# Patient Record
Sex: FEMALE | Race: WHITE | NOT HISPANIC OR LATINO | Employment: FULL TIME | URBAN - METROPOLITAN AREA
[De-identification: names, ages, dates, MRNs, and addresses within clinical notes are randomized per-mention and may not be internally consistent; named-entity substitution may affect disease eponyms.]

---

## 2021-02-11 ENCOUNTER — IMMUNIZATION (OUTPATIENT)
Dept: PRIMARY CARE CLINIC | Facility: CLINIC | Age: 31
End: 2021-02-11
Payer: COMMERCIAL

## 2021-02-11 DIAGNOSIS — Z23 NEED FOR VACCINATION: Primary | ICD-10-CM

## 2021-02-11 PROCEDURE — 0001A COVID-19, MRNA, LNP-S, PF, 30 MCG/0.3 ML DOSE VACCINE: ICD-10-PCS | Mod: S$GLB,,, | Performed by: FAMILY MEDICINE

## 2021-02-11 PROCEDURE — 91300 COVID-19, MRNA, LNP-S, PF, 30 MCG/0.3 ML DOSE VACCINE: ICD-10-PCS | Mod: S$GLB,,, | Performed by: FAMILY MEDICINE

## 2021-02-11 PROCEDURE — 0001A COVID-19, MRNA, LNP-S, PF, 30 MCG/0.3 ML DOSE VACCINE: CPT | Mod: S$GLB,,, | Performed by: FAMILY MEDICINE

## 2021-02-11 PROCEDURE — 91300 COVID-19, MRNA, LNP-S, PF, 30 MCG/0.3 ML DOSE VACCINE: CPT | Mod: S$GLB,,, | Performed by: FAMILY MEDICINE

## 2021-03-04 ENCOUNTER — IMMUNIZATION (OUTPATIENT)
Dept: PRIMARY CARE CLINIC | Facility: CLINIC | Age: 31
End: 2021-03-04
Payer: COMMERCIAL

## 2021-03-04 DIAGNOSIS — Z23 NEED FOR VACCINATION: Primary | ICD-10-CM

## 2021-03-04 PROCEDURE — 91300 COVID-19, MRNA, LNP-S, PF, 30 MCG/0.3 ML DOSE VACCINE: ICD-10-PCS | Mod: S$GLB,,, | Performed by: FAMILY MEDICINE

## 2021-03-04 PROCEDURE — 0002A COVID-19, MRNA, LNP-S, PF, 30 MCG/0.3 ML DOSE VACCINE: ICD-10-PCS | Mod: CV19,S$GLB,, | Performed by: FAMILY MEDICINE

## 2021-03-04 PROCEDURE — 0002A COVID-19, MRNA, LNP-S, PF, 30 MCG/0.3 ML DOSE VACCINE: CPT | Mod: CV19,S$GLB,, | Performed by: FAMILY MEDICINE

## 2021-03-04 PROCEDURE — 91300 COVID-19, MRNA, LNP-S, PF, 30 MCG/0.3 ML DOSE VACCINE: CPT | Mod: S$GLB,,, | Performed by: FAMILY MEDICINE

## 2021-05-05 ENCOUNTER — OFFICE VISIT (OUTPATIENT)
Dept: CARDIOLOGY | Facility: CLINIC | Age: 31
End: 2021-05-05
Payer: COMMERCIAL

## 2021-05-05 ENCOUNTER — TELEPHONE (OUTPATIENT)
Dept: CARDIOLOGY | Facility: CLINIC | Age: 31
End: 2021-05-05

## 2021-05-05 VITALS
WEIGHT: 149 LBS | DIASTOLIC BLOOD PRESSURE: 67 MMHG | SYSTOLIC BLOOD PRESSURE: 98 MMHG | BODY MASS INDEX: 23.39 KG/M2 | HEIGHT: 67 IN | HEART RATE: 85 BPM

## 2021-05-05 DIAGNOSIS — I73.00 RAYNAUD'S DISEASE WITHOUT GANGRENE: ICD-10-CM

## 2021-05-05 DIAGNOSIS — I87.2 VENOUS INSUFFICIENCY OF BOTH LOWER EXTREMITIES: Primary | ICD-10-CM

## 2021-05-05 DIAGNOSIS — I83.813 VARICOSE VEINS OF BOTH LOWER EXTREMITIES WITH PAIN: ICD-10-CM

## 2021-05-05 PROCEDURE — 3008F BODY MASS INDEX DOCD: CPT | Mod: CPTII,S$GLB,, | Performed by: INTERNAL MEDICINE

## 2021-05-05 PROCEDURE — 99999 PR PBB SHADOW E&M-EST. PATIENT-LVL III: ICD-10-PCS | Mod: PBBFAC,,, | Performed by: INTERNAL MEDICINE

## 2021-05-05 PROCEDURE — 1126F AMNT PAIN NOTED NONE PRSNT: CPT | Mod: S$GLB,,, | Performed by: INTERNAL MEDICINE

## 2021-05-05 PROCEDURE — 99205 PR OFFICE/OUTPT VISIT, NEW, LEVL V, 60-74 MIN: ICD-10-PCS | Mod: S$GLB,,, | Performed by: INTERNAL MEDICINE

## 2021-05-05 PROCEDURE — 99999 PR PBB SHADOW E&M-EST. PATIENT-LVL III: CPT | Mod: PBBFAC,,, | Performed by: INTERNAL MEDICINE

## 2021-05-05 PROCEDURE — 99205 OFFICE O/P NEW HI 60 MIN: CPT | Mod: S$GLB,,, | Performed by: INTERNAL MEDICINE

## 2021-05-05 PROCEDURE — 1126F PR PAIN SEVERITY QUANTIFIED, NO PAIN PRESENT: ICD-10-PCS | Mod: S$GLB,,, | Performed by: INTERNAL MEDICINE

## 2021-05-05 PROCEDURE — 3008F PR BODY MASS INDEX (BMI) DOCUMENTED: ICD-10-PCS | Mod: CPTII,S$GLB,, | Performed by: INTERNAL MEDICINE

## 2021-05-05 RX ORDER — LORAZEPAM 0.5 MG/1
0.5 TABLET ORAL
COMMUNITY
Start: 2020-11-13 | End: 2024-03-08

## 2021-05-05 RX ORDER — AMLODIPINE BESYLATE 10 MG/1
10 TABLET ORAL
COMMUNITY
Start: 2020-11-13 | End: 2024-03-08

## 2021-05-05 RX ORDER — PANTOPRAZOLE SODIUM 40 MG/1
40 TABLET, DELAYED RELEASE ORAL 2 TIMES DAILY
COMMUNITY
Start: 2020-12-25 | End: 2021-12-03 | Stop reason: ALTCHOICE

## 2021-05-05 RX ORDER — ATORVASTATIN CALCIUM 40 MG/1
40 TABLET, FILM COATED ORAL
COMMUNITY
Start: 2020-11-13 | End: 2024-03-08

## 2021-12-03 ENCOUNTER — OFFICE VISIT (OUTPATIENT)
Dept: GASTROENTEROLOGY | Facility: CLINIC | Age: 31
End: 2021-12-03
Payer: COMMERCIAL

## 2021-12-03 VITALS — WEIGHT: 151 LBS | HEIGHT: 67 IN | BODY MASS INDEX: 23.7 KG/M2

## 2021-12-03 DIAGNOSIS — J02.9 SORE THROAT: ICD-10-CM

## 2021-12-03 DIAGNOSIS — R12 HEARTBURN: ICD-10-CM

## 2021-12-03 DIAGNOSIS — R05.9 COUGH: ICD-10-CM

## 2021-12-03 DIAGNOSIS — R09.A2 GLOBUS SENSATION: ICD-10-CM

## 2021-12-03 DIAGNOSIS — R07.9 NONSPECIFIC CHEST PAIN: ICD-10-CM

## 2021-12-03 DIAGNOSIS — Z87.19 HISTORY OF BARRETT'S ESOPHAGUS: Primary | ICD-10-CM

## 2021-12-03 PROBLEM — K21.9 GASTROESOPHAGEAL REFLUX DISEASE: Status: ACTIVE | Noted: 2021-01-06

## 2021-12-03 PROBLEM — J84.10 FIBROSIS OF LUNG: Status: ACTIVE | Noted: 2021-01-06

## 2021-12-03 PROBLEM — E55.9 HYPOVITAMINOSIS D: Status: ACTIVE | Noted: 2021-01-06

## 2021-12-03 PROBLEM — M34.9 SCLERODERMA: Status: ACTIVE | Noted: 2021-01-06

## 2021-12-03 PROBLEM — I73.00 RAYNAUD'S DISEASE WITHOUT GANGRENE: Status: ACTIVE | Noted: 2018-08-07

## 2021-12-03 PROBLEM — Z87.898 HISTORY OF VERTIGO: Status: ACTIVE | Noted: 2021-12-03

## 2021-12-03 PROBLEM — L98.499: Status: ACTIVE | Noted: 2018-08-07

## 2021-12-03 PROCEDURE — 99999 PR PBB SHADOW E&M-EST. PATIENT-LVL III: ICD-10-PCS | Mod: PBBFAC,,, | Performed by: NURSE PRACTITIONER

## 2021-12-03 PROCEDURE — 99203 OFFICE O/P NEW LOW 30 MIN: CPT | Mod: S$GLB,,, | Performed by: NURSE PRACTITIONER

## 2021-12-03 PROCEDURE — 99203 PR OFFICE/OUTPT VISIT, NEW, LEVL III, 30-44 MIN: ICD-10-PCS | Mod: S$GLB,,, | Performed by: NURSE PRACTITIONER

## 2021-12-03 PROCEDURE — 99999 PR PBB SHADOW E&M-EST. PATIENT-LVL III: CPT | Mod: PBBFAC,,, | Performed by: NURSE PRACTITIONER

## 2021-12-03 RX ORDER — FAMOTIDINE 40 MG/1
40 TABLET, FILM COATED ORAL NIGHTLY
Qty: 30 TABLET | Refills: 3 | Status: SHIPPED | OUTPATIENT
Start: 2021-12-03 | End: 2022-07-05

## 2021-12-03 RX ORDER — ESCITALOPRAM OXALATE 10 MG/1
1 TABLET ORAL DAILY
COMMUNITY
Start: 2021-01-06 | End: 2024-03-08 | Stop reason: SDUPTHER

## 2021-12-03 RX ORDER — RABEPRAZOLE SODIUM 20 MG/1
20 TABLET, DELAYED RELEASE ORAL
Qty: 30 TABLET | Refills: 3 | Status: SHIPPED | OUTPATIENT
Start: 2021-12-03 | End: 2022-02-10

## 2021-12-06 ENCOUNTER — ANESTHESIA (OUTPATIENT)
Dept: ENDOSCOPY | Facility: HOSPITAL | Age: 31
End: 2021-12-06
Payer: COMMERCIAL

## 2021-12-06 ENCOUNTER — ANESTHESIA EVENT (OUTPATIENT)
Dept: ENDOSCOPY | Facility: HOSPITAL | Age: 31
End: 2021-12-06
Payer: COMMERCIAL

## 2021-12-06 ENCOUNTER — HOSPITAL ENCOUNTER (OUTPATIENT)
Facility: HOSPITAL | Age: 31
Discharge: HOME | End: 2021-12-06
Attending: INTERNAL MEDICINE | Admitting: INTERNAL MEDICINE
Payer: COMMERCIAL

## 2021-12-06 VITALS
TEMPERATURE: 98 F | OXYGEN SATURATION: 98 % | RESPIRATION RATE: 16 BRPM | DIASTOLIC BLOOD PRESSURE: 62 MMHG | HEART RATE: 58 BPM | SYSTOLIC BLOOD PRESSURE: 105 MMHG

## 2021-12-06 DIAGNOSIS — K22.70 BARRETT'S ESOPHAGUS: ICD-10-CM

## 2021-12-06 LAB
B-HCG UR QL: NEGATIVE
CTP QC/QA: YES

## 2021-12-06 PROCEDURE — 88305 TISSUE EXAM BY PATHOLOGIST: CPT | Performed by: PATHOLOGY

## 2021-12-06 PROCEDURE — 43239 EGD BIOPSY SINGLE/MULTIPLE: CPT | Mod: ,,, | Performed by: INTERNAL MEDICINE

## 2021-12-06 PROCEDURE — 88312 SPECIAL STAINS GROUP 1: CPT | Performed by: PATHOLOGY

## 2021-12-06 PROCEDURE — 88305 TISSUE EXAM BY PATHOLOGIST: ICD-10-PCS | Mod: 26,,, | Performed by: PATHOLOGY

## 2021-12-06 PROCEDURE — 63600175 PHARM REV CODE 636 W HCPCS: Mod: PO | Performed by: INTERNAL MEDICINE

## 2021-12-06 PROCEDURE — 63600175 PHARM REV CODE 636 W HCPCS: Mod: PO | Performed by: NURSE ANESTHETIST, CERTIFIED REGISTERED

## 2021-12-06 PROCEDURE — D9220A PRA ANESTHESIA: Mod: ANES,,, | Performed by: ANESTHESIOLOGY

## 2021-12-06 PROCEDURE — 88312 PR  SPECIAL STAINS,GROUP I: ICD-10-PCS | Mod: 26,,, | Performed by: PATHOLOGY

## 2021-12-06 PROCEDURE — 37000008 HC ANESTHESIA 1ST 15 MINUTES: Mod: PO | Performed by: INTERNAL MEDICINE

## 2021-12-06 PROCEDURE — D9220A PRA ANESTHESIA: ICD-10-PCS | Mod: ANES,,, | Performed by: ANESTHESIOLOGY

## 2021-12-06 PROCEDURE — 43239 PR EGD, FLEX, W/BIOPSY, SGL/MULTI: ICD-10-PCS | Mod: ,,, | Performed by: INTERNAL MEDICINE

## 2021-12-06 PROCEDURE — 81025 URINE PREGNANCY TEST: CPT | Mod: PO | Performed by: INTERNAL MEDICINE

## 2021-12-06 PROCEDURE — D9220A PRA ANESTHESIA: ICD-10-PCS | Mod: CRNA,,, | Performed by: NURSE ANESTHETIST, CERTIFIED REGISTERED

## 2021-12-06 PROCEDURE — D9220A PRA ANESTHESIA: Mod: CRNA,,, | Performed by: NURSE ANESTHETIST, CERTIFIED REGISTERED

## 2021-12-06 PROCEDURE — 88305 TISSUE EXAM BY PATHOLOGIST: CPT | Mod: 26,,, | Performed by: PATHOLOGY

## 2021-12-06 PROCEDURE — 27201012 HC FORCEPS, HOT/COLD, DISP: Mod: PO | Performed by: INTERNAL MEDICINE

## 2021-12-06 PROCEDURE — 43239 EGD BIOPSY SINGLE/MULTIPLE: CPT | Mod: PO | Performed by: INTERNAL MEDICINE

## 2021-12-06 PROCEDURE — 88312 SPECIAL STAINS GROUP 1: CPT | Mod: 26,,, | Performed by: PATHOLOGY

## 2021-12-06 PROCEDURE — 37000009 HC ANESTHESIA EA ADD 15 MINS: Mod: PO | Performed by: INTERNAL MEDICINE

## 2021-12-06 RX ORDER — SODIUM CHLORIDE, SODIUM LACTATE, POTASSIUM CHLORIDE, CALCIUM CHLORIDE 600; 310; 30; 20 MG/100ML; MG/100ML; MG/100ML; MG/100ML
INJECTION, SOLUTION INTRAVENOUS CONTINUOUS
Status: DISCONTINUED | OUTPATIENT
Start: 2021-12-06 | End: 2021-12-06 | Stop reason: HOSPADM

## 2021-12-06 RX ORDER — FENTANYL CITRATE 50 UG/ML
INJECTION, SOLUTION INTRAMUSCULAR; INTRAVENOUS
Status: DISCONTINUED | OUTPATIENT
Start: 2021-12-06 | End: 2021-12-06

## 2021-12-06 RX ORDER — PROPOFOL 10 MG/ML
VIAL (ML) INTRAVENOUS
Status: DISCONTINUED | OUTPATIENT
Start: 2021-12-06 | End: 2021-12-06

## 2021-12-06 RX ORDER — SODIUM CHLORIDE 0.9 % (FLUSH) 0.9 %
10 SYRINGE (ML) INJECTION
Status: DISCONTINUED | OUTPATIENT
Start: 2021-12-06 | End: 2021-12-06 | Stop reason: HOSPADM

## 2021-12-06 RX ADMIN — PROPOFOL 50 MG: 10 INJECTION, EMULSION INTRAVENOUS at 09:12

## 2021-12-06 RX ADMIN — SODIUM CHLORIDE, SODIUM LACTATE, POTASSIUM CHLORIDE, AND CALCIUM CHLORIDE: .6; .31; .03; .02 INJECTION, SOLUTION INTRAVENOUS at 08:12

## 2021-12-06 RX ADMIN — FENTANYL CITRATE 50 MCG: 50 INJECTION, SOLUTION INTRAMUSCULAR; INTRAVENOUS at 09:12

## 2021-12-13 ENCOUNTER — TELEPHONE (OUTPATIENT)
Dept: GASTROENTEROLOGY | Facility: CLINIC | Age: 31
End: 2021-12-13
Payer: COMMERCIAL

## 2021-12-14 ENCOUNTER — TELEPHONE (OUTPATIENT)
Dept: GASTROENTEROLOGY | Facility: CLINIC | Age: 31
End: 2021-12-14
Payer: COMMERCIAL

## 2021-12-17 LAB
FINAL PATHOLOGIC DIAGNOSIS: NORMAL
GROSS: NORMAL
Lab: NORMAL

## 2022-02-10 ENCOUNTER — PATIENT MESSAGE (OUTPATIENT)
Dept: GASTROENTEROLOGY | Facility: CLINIC | Age: 32
End: 2022-02-10
Payer: COMMERCIAL

## 2022-02-10 DIAGNOSIS — Z87.19 HISTORY OF BARRETT'S ESOPHAGUS: Primary | ICD-10-CM

## 2022-02-10 DIAGNOSIS — R12 HEARTBURN: ICD-10-CM

## 2022-02-10 RX ORDER — OMEPRAZOLE 40 MG/1
40 CAPSULE, DELAYED RELEASE ORAL
Qty: 60 CAPSULE | Refills: 5 | Status: SHIPPED | OUTPATIENT
Start: 2022-02-10 | End: 2023-03-01 | Stop reason: SDUPTHER

## 2022-04-07 PROBLEM — Z30.432 ENCOUNTER FOR IUD REMOVAL: Status: ACTIVE | Noted: 2022-04-07

## 2022-04-07 PROBLEM — T83.32XA MALPOSITIONED INTRAUTERINE DEVICE: Status: ACTIVE | Noted: 2022-04-07

## 2022-05-09 ENCOUNTER — OFFICE VISIT (OUTPATIENT)
Dept: OPTOMETRY | Facility: CLINIC | Age: 32
End: 2022-05-09
Payer: COMMERCIAL

## 2022-05-09 DIAGNOSIS — H52.7 REFRACTIVE ERROR: ICD-10-CM

## 2022-05-09 DIAGNOSIS — M34.9 SCLERODERMA: Primary | ICD-10-CM

## 2022-05-09 PROCEDURE — 99999 PR PBB SHADOW E&M-EST. PATIENT-LVL III: ICD-10-PCS | Mod: PBBFAC,,, | Performed by: OPTOMETRIST

## 2022-05-09 PROCEDURE — 1159F PR MEDICATION LIST DOCUMENTED IN MEDICAL RECORD: ICD-10-PCS | Mod: CPTII,S$GLB,, | Performed by: OPTOMETRIST

## 2022-05-09 PROCEDURE — 92004 COMPRE OPH EXAM NEW PT 1/>: CPT | Mod: S$GLB,,, | Performed by: OPTOMETRIST

## 2022-05-09 PROCEDURE — 92004 PR EYE EXAM, NEW PATIENT,COMPREHESV: ICD-10-PCS | Mod: S$GLB,,, | Performed by: OPTOMETRIST

## 2022-05-09 PROCEDURE — 1160F PR REVIEW ALL MEDS BY PRESCRIBER/CLIN PHARMACIST DOCUMENTED: ICD-10-PCS | Mod: CPTII,S$GLB,, | Performed by: OPTOMETRIST

## 2022-05-09 PROCEDURE — 99999 PR PBB SHADOW E&M-EST. PATIENT-LVL III: CPT | Mod: PBBFAC,,, | Performed by: OPTOMETRIST

## 2022-05-09 PROCEDURE — 1160F RVW MEDS BY RX/DR IN RCRD: CPT | Mod: CPTII,S$GLB,, | Performed by: OPTOMETRIST

## 2022-05-09 PROCEDURE — 1159F MED LIST DOCD IN RCRD: CPT | Mod: CPTII,S$GLB,, | Performed by: OPTOMETRIST

## 2022-05-09 RX ORDER — PANTOPRAZOLE SODIUM 40 MG/1
1 TABLET, DELAYED RELEASE ORAL
COMMUNITY
Start: 2022-02-11 | End: 2022-07-05

## 2022-05-09 NOTE — PROGRESS NOTES
HPI     Concerns About Ocular Health      Additional comments: Ocular health exam              Comments     DLE: x 3 yrs    Pt states decrease in near vision over past year. Was prescribed rx gls x   3 yrs ago but did not have filled.   No drops used          Last edited by Mod[place] Eli, OD on 5/9/2022  9:59 AM. (History)            Assessment /Plan     For exam results, see Encounter Report.    Scleroderma    Refractive error      1. No ocular signs at this time, use artificial tears for dryness as needed, RTC yearly eye exam.  2. Cont with OTC +1.00 to +1.25

## 2022-07-04 DIAGNOSIS — Z87.19 HISTORY OF BARRETT'S ESOPHAGUS: ICD-10-CM

## 2022-07-04 DIAGNOSIS — R12 HEARTBURN: ICD-10-CM

## 2022-07-05 RX ORDER — FAMOTIDINE 40 MG/1
TABLET, FILM COATED ORAL
Qty: 30 TABLET | Refills: 3 | Status: SHIPPED | OUTPATIENT
Start: 2022-07-05 | End: 2023-03-01 | Stop reason: SDUPTHER

## 2024-01-25 ENCOUNTER — TELEPHONE (OUTPATIENT)
Dept: GASTROENTEROLOGY | Facility: CLINIC | Age: 34
End: 2024-01-25
Payer: COMMERCIAL

## 2024-01-25 NOTE — TELEPHONE ENCOUNTER
----- Message from Sherry Gordon sent at 1/25/2024 11:03 AM CST -----  Regarding: Reschedule  Pt would like a call to reschedule her appt.   She waited and then Iwe realized she was not fully checked in and missed her appt.   I did offer her the available appt that was offered but she would just like a call to be rescheduled.   Thank you

## 2024-03-08 PROBLEM — F41.9 ANXIETY AND DEPRESSION: Status: ACTIVE | Noted: 2024-03-08

## 2024-03-08 PROBLEM — F32.A ANXIETY AND DEPRESSION: Status: ACTIVE | Noted: 2024-03-08

## 2024-04-09 ENCOUNTER — TELEPHONE (OUTPATIENT)
Dept: GASTROENTEROLOGY | Facility: CLINIC | Age: 34
End: 2024-04-09
Payer: COMMERCIAL

## 2024-04-09 NOTE — TELEPHONE ENCOUNTER
----- Message from Jesús Serrano sent at 4/9/2024  9:55 AM CDT -----  Regarding: Appt request  Type:  Appointment Request    Caller is requesting an appointment.     Name of Caller:  Pt    Symptoms: follow up    Would the patient rather a call back or a response via [facility]? Call back    Best Call Back Number:  [phone]      Additional Information:  No provider comes up after completing the [employer].   Please advise -- Thank you

## 2024-04-16 ENCOUNTER — OFFICE VISIT (OUTPATIENT)
Dept: GASTROENTEROLOGY | Facility: CLINIC | Age: 34
End: 2024-04-16
Payer: COMMERCIAL

## 2024-04-16 VITALS — BODY MASS INDEX: 23.08 KG/M2 | WEIGHT: 147.06 LBS | HEIGHT: 67 IN

## 2024-04-16 DIAGNOSIS — K22.70 BARRETT'S ESOPHAGUS WITHOUT DYSPLASIA: Primary | ICD-10-CM

## 2024-04-16 DIAGNOSIS — Z51.81 ENCOUNTER FOR MONITORING LONG-TERM PROTON PUMP INHIBITOR THERAPY: ICD-10-CM

## 2024-04-16 DIAGNOSIS — Z79.899 ENCOUNTER FOR MONITORING LONG-TERM PROTON PUMP INHIBITOR THERAPY: ICD-10-CM

## 2024-04-16 PROCEDURE — 99214 OFFICE O/P EST MOD 30 MIN: CPT | Mod: S$GLB,,, | Performed by: NURSE PRACTITIONER

## 2024-04-16 PROCEDURE — 1159F MED LIST DOCD IN RCRD: CPT | Mod: CPTII,S$GLB,, | Performed by: NURSE PRACTITIONER

## 2024-04-16 PROCEDURE — 3008F BODY MASS INDEX DOCD: CPT | Mod: CPTII,S$GLB,, | Performed by: NURSE PRACTITIONER

## 2024-04-16 PROCEDURE — 99999 PR PBB SHADOW E&M-EST. PATIENT-LVL III: CPT | Mod: PBBFAC,,, | Performed by: NURSE PRACTITIONER

## 2024-04-16 PROCEDURE — 1160F RVW MEDS BY RX/DR IN RCRD: CPT | Mod: CPTII,S$GLB,, | Performed by: NURSE PRACTITIONER

## 2024-04-16 RX ORDER — MYCOPHENOLATE MOFETIL 500 MG/1
500 TABLET ORAL 2 TIMES DAILY
COMMUNITY

## 2024-04-16 NOTE — PATIENT INSTRUCTIONS
Gonzalez's Esophagus Discharge Instructions   About this topic   The esophagus is a tube which passes food from the mouth to the stomach. Sometimes, normal cells in the lower part of the esophagus change to a different kind of cell. This is called Gonzalez's esophagus. It is most often caused by acid reflux which happens when the contents of the belly leak into the food pipe. This leaking can irritate the food pipe. If you have acid reflux, your doctor may also check to see if you have Gonzalez's esophagus. Gonzalez's esophagus is not cancer. A small number of the abnormal cells may turn into precancer or cancer of the esophagus. Doctors take a biopsy of your esophagus to find these abnormal cells.     What care is needed at home?   Ask your doctor what you need to do when you go home. Make sure you ask questions if you do not understand what the doctor says. This way you will know what you need to do.  Take all drugs as ordered by your doctor. Drink lots of water when taking your drugs.  Keep a healthy weight.  Avoid stress.  Avoid belts and clothing that are too tight.  Eat small meals more often. Do not skip meals. Do not eat large meals to make up for missed meals.  Avoid eating 2 to 3 hours before bedtime.  Avoid foods that cause you to have heartburn.  Do not to lie down for at least 2 hours after eating.  Raise the head of your bed 6 to 8 inches (15 to 20 cm). Use wooden blocks under the head of the bed. Just sleeping with your head raised on pillows is not enough.  Do not drink beer, wine, and mixed drinks (alcohol).  Do not smoke.  What follow-up care is needed?   Your doctor may ask you to make visits to the office to check on your progress. Be sure to keep these visits. You may need regular checks of your esophagus.  What drugs may be needed?   The doctor may order drugs to:  Relieve heartburn  Prevent reflux  Lessen acid production  Heal the esophageal lining  Will physical activity be limited?   Your  physical activities will not be limited.  What changes to diet are needed?   Limit caffeine intake.  Avoid eating oranges, berries, tomatoes, and other foods high in acid.  Eat only small amounts of spicy, fatty, and fried foods, or avoid them altogether.  What problems could happen?   Precancerous changes in the esophagus  Higher risk of cancer of the esophagus  What can be done to prevent this health problem?   Eat a healthy diet. Eat lots of fruits and vegetables.  If you weigh too much, lose weight.  If you are a smoker, stop smoking.  Limit beer, wine, and mixed drinks (alcohol).  When do I need to call the doctor?   Pain or a feeling of food getting stuck in your throat  Throwing up often or throwing up fluid that looks like blood or coffee grounds  Black tarry stools  Pain in the neck, chest, or back  Very bad heartburn that lasts for a long time  Cough, hoarse voice, or bad breath  Wheezing, shortness of breath or other problems breathing  Unintended weight loss or not wanting to eat  You are not feeling better in 2 to 3 days or you are feeling worse  Teach Back: Helping You Understand   The Teach Back Method helps you understand the information we are giving you. After you talk with the staff, tell them in your own words what you learned. This helps to make sure the staff has described each thing clearly. It also helps to explain things that may have been confusing. Before going home, make sure you can do these:  I can tell you about my condition.  I can tell you what I can do to help my acid reflux.  I can tell you what I will do if I feel like I have food stuck in my throat; pain in my neck, chest, or back; or have trouble breathing.  Where can I learn more?   American Academy [employer]  [email or web address]   Last Reviewed Date   2021-03-18  Consumer Information Use and Disclaimer   This information is not specific medical advice and does not replace  information you receive from your health care provider. This is only a brief summary of general information. It does NOT include all information about conditions, illnesses, injuries, tests, procedures, treatments, therapies, discharge instructions or life-style choices that may apply to you. You must talk with your health care provider for complete information about your health and treatment options. This information should not be used to decide whether or not to accept your health care providers advice, instructions or recommendations. Only your health care provider has the knowledge and training to provide advice that is right for you.  Copyright   Copyright © 2021 UpToDate, Inc. and its affiliates and/or licensors. All rights reserved.      Gonzalez's Esophagus   The Basics   Written by the doctors and editors at [employer]   What is Gonzalez's esophagus? -- Gonzalez's esophagus is a condition that affects the esophagus (the tube that carries food from the mouth to the stomach) (figure 1). When people have Gonzalez's esophagus, the normal cells in the lower part of their esophagus are replaced by a different type of cell.  Gonzalez's esophagus is usually caused by acid reflux. Acid reflux is when the acid that is normally in your stomach backs up into the esophagus. Many people with acid reflux never get Gonzalez's esophagus, but some do.  If you have had acid reflux for a long time, it's important to know if you also have Gonzalez's esophagus. That's because Gonzalez's esophagus can later turn into pre-cancer or cancer of the esophagus.  What are the symptoms of Gonzalez's esophagus? -- Gonzalez's esophagus does not cause any symptoms. But people usually have symptoms from their acid reflux, such as:  Burning in the chest, known as heartburn  Burning in the throat or an acid taste in the throat  Vomiting after eating  Trouble swallowing  Is there a test for Gonzalez's esophagus? -- Yes. Your doctor can do a test called an  upper endoscopy to check for Gonzalez's esophagus. Your doctor might do this if you have had acid reflux for more than 5 years.  During an upper endoscopy, a doctor puts a thin tube with a camera and light on the end into your mouth and down into your esophagus (figure 2). He or she will look at the lining of the esophagus and take a small sample of it. Another doctor will look at the cells under a microscope to see if you have Gonzalez's esophagus.  How is Gonzalez's esophagus treated? -- Gonzalez's esophagus is treated by reducing or getting rid of a person's acid reflux. Treatment does not usually cure Gonzalez's esophagus, but it keeps it from getting worse.  Your doctor will likely give you medicines to stop your stomach from making acid. He or she might also recommend that you:  Avoid caffeine drinks, alcohol, chocolate, peppermint, and fatty foods. These foods can make acid reflux worse.  Avoid eating before going to bed, eating large meals, or lying down after eating  Raise the head of your bed by 6 to 8 inches (for example, by putting wood blocks under 2 legs of the bed)  Should I follow up with my doctor? -- Yes. If you have Gonzalez's esophagus, you should follow up with your doctor. He or she will keep checking that your Gonzalez's esophagus does not turn into pre-cancer or cancer.  What if my Gonzalez's esophagus turns into pre-cancer or cancer? -- If this happens, your doctor will talk with you about different ways to treat it.  All topics are updated as new evidence becomes available and our peer review process is complete.  This topic retrieved from [employer] on: Sep 21, 2021.  Topic 28645 Version 10.0  Release: 29.4.2 - C29.263  © 2021 UpToDate, Inc. and/or its affiliates. All rights reserved.  figure 1: Upper digestive tract     The upper digestive tract includes the esophagus (the tube that connects the mouth to the stomach), the stomach, and the duodenum (the first part of the small intestine).  Graphic  23803 Version 6.0    figure 2: Upper endoscopy     During an upper endoscopy, you lie down and the doctor puts a thin tube with a camera and light on the end (called an endoscope) into your mouth and down into your esophagus, stomach, and duodenum (the first part of your small intestine). The camera sends pictures from inside your body to a television screen. That way, your doctor can see the inside of your esophagus, stomach, and duodenum.  Graphic 39914 Version 4.0    Consumer Information Use and Disclaimer   This information is not specific medical advice and does not replace information you receive from your health care provider. This is only a brief summary of general information. It does NOT include all information about conditions, illnesses, injuries, tests, procedures, treatments, therapies, discharge instructions or life-style choices that may apply to you. You must talk with your health care provider for complete information about your health and treatment options. This information should not be used to decide whether or not to accept your health care provider's advice, instructions or recommendations. Only your health care provider has the knowledge and training to provide advice that is right for you. The use of this information is governed by the [employer] End User License Agreement, available at https://www.[employer]/en/solutions/Apps4All/about/beth.The use of [employer] content is governed by the [employer] Terms of Use. ©2021 UpToDate, Inc. All rights reserved.  Copyright   © 2021 UpToDate, Inc. and/or its affiliates. All rights reserved.

## 2024-04-16 NOTE — PROGRESS NOTES
Subjective:       Patient ID: Nikki Rico is a 33 y.o. female Body mass index is 23.03 kg/m².    Chief Complaint: barretts    This patient is established with Dr. Michelle & myself.     Reports her rheumatologist recently did a full lab workup, Dr Armenta.    Gastroesophageal Reflux  She complains of heartburn (occurs a handful of times a month). She reports no abdominal pain, no belching, no chest pain, no choking, no coughing, no dysphagia, no globus sensation, no hoarse voice, no nausea or no sore throat. This is a chronic (started several years ago) problem. The problem has been gradually worsening. The symptoms are aggravated by ETOH and certain foods. Pertinent negatives include no fatigue, melena or weight loss. Risk factors include smoking/tobacco exposure, hiatal hernia, Gonzalez's esophagus and ETOH use. She has tried a PPI, a histamine-2 antagonist, an antacid and ETOH reduction (protonix 40 mg once daily and occasional BID PRN, pepcid 40 mg nightly; PAST: zantac- helped, prilosec, nexium- only mild relief with use, aciphex- cost) for the symptoms. Past procedures include an EGD.     Review of Systems   Constitutional:  Negative for appetite change, chills, fatigue, fever and weight loss.   HENT:  Negative for hoarse voice, sore throat and trouble swallowing.    Respiratory:  Negative for cough, choking and shortness of breath.    Cardiovascular:  Negative for chest pain.   Gastrointestinal:  Positive for heartburn (occurs a handful of times a month). Negative for abdominal pain, anal bleeding, blood in stool, constipation, diarrhea, dysphagia, melena, nausea, rectal pain and vomiting.   Genitourinary:  Negative for difficulty urinating, dysuria and flank pain.   Neurological:  Negative for weakness.       No LMP recorded. Patient has had an implant.  Past Medical History:   Diagnosis Date    Gonzalez's esophagus 2013    Depression with anxiety     Lung fibrosis     Raynaud's disease     Scleroderma       Past Surgical History:   Procedure Laterality Date    ESOPHAGOGASTRODUODENOSCOPY  01/2017    patient states barretts and hiatal hernia--told to rpt 3yrs    ESOPHAGOGASTRODUODENOSCOPY  2018    Dr. Yoo, repeat in 3 years per patient report    ESOPHAGOGASTRODUODENOSCOPY N/A 12/06/2021    Procedure: EGD (ESOPHAGOGASTRODUODENOSCOPY);  Surgeon: Beto Michelle Jr., MD;  Location: [facility];  Service: Endoscopy;  Laterality: N/A;  Repeat upper endoscopy in 2-3 years for surveillance of Gonzalez's esophagus    HYSTEROSCOPY N/A 04/07/2022    Procedure: HYSTEROSCOPY/ IUD removal;  Surgeon: Mikey Cazares MD;  Location: [facility];  Service: OB/GYN;  Laterality: N/A;    HYSTEROSCOPY  04/07/2022    Procedure: ;  Surgeon: Mikey Cazares MD;  Location: [facility];  Service: OB/GYN;;     Family History   Problem Relation Name Age of Onset    Heart disease Mother          had cabg    Cancer Father          lung (smoker)    No Known Problems Sister      No Known Problems Sister      No Known Problems Brother      No Known Problems Brother      No Known Problems Brother      Colon cancer Neg Hx      Crohn's disease Neg Hx      Esophageal cancer Neg Hx      Stomach cancer Neg Hx      Ulcerative colitis Neg Hx      Glaucoma Neg Hx      Macular degeneration Neg Hx      Retinal detachment Neg Hx       Social History     Tobacco Use    Smoking status: Former     Types: Cigarettes    Smokeless tobacco: Never   Substance Use Topics    Alcohol use: Yes     Alcohol/week: 5.0 standard drinks of alcohol     Types: 5 Shots of liquor per week    Drug use: Never     Wt Readings from Last 10 Encounters:   04/16/24 66.7 kg (147 lb 0.8 oz)   03/08/24 69.1 kg (152 lb 4.8 oz)   04/07/22 68.2 kg (150 lb 5.7 oz)   12/03/21 68.5 kg (151 lb 0.2 oz)   05/05/21 67.6 kg (149 lb)     Reviewed prior medical records including endoscopy history (see surgical history).    Objective:      Physical Exam  Vitals and nursing note reviewed.   Constitutional:        General: She is not in acute distress.     Appearance: Normal appearance. She is well-developed. She is not diaphoretic.   HENT:      Mouth/Throat:      Lips: Pink. No lesions.      Mouth: Mucous membranes are moist. No oral lesions.      Tongue: No lesions.      Pharynx: Oropharynx is clear. No pharyngeal swelling or posterior oropharyngeal erythema.   Eyes:      General: No scleral icterus.     Conjunctiva/sclera: Conjunctivae normal.   Pulmonary:      Effort: Pulmonary effort is normal. No respiratory distress.      Breath sounds: Normal breath sounds. No wheezing.   Abdominal:      General: Bowel sounds are normal. There is no distension or abdominal bruit.      Palpations: Abdomen is soft. Abdomen is not rigid. There is no mass.      Tenderness: There is no abdominal tenderness. There is no guarding or rebound. Negative signs include Gonzáles's sign and McBurney's sign.   Skin:     General: Skin is warm and dry.      Coloration: Skin is not jaundiced or pale.      Findings: No erythema or rash.   Neurological:      Mental Status: She is alert and oriented to person, place, and time.   Psychiatric:         Behavior: Behavior normal.         Thought Content: Thought content normal.         Judgment: Judgment normal.         Assessment:       1. Gonzalez's esophagus without dysplasia    2. Encounter for monitoring long-term proton pump inhibitor therapy          Plan:     Patient agreed to sign medical records release consent for us to obtain records from Dr. Armenta (to include lab work)     Gonzalez's esophagus without dysplasia  - schedule EGD, discussed procedure with patient, including risks and benefits, patient verbalized understanding  - discussed diagnosis in detail with patient and the need for long term reflux medication and surveillance EGDs; patient verbalized understanding and agreed with management plan  - Discussed about treatment options, patient verbalized understanding and patient reports she  wants to continue current medications at prescribed dosages.  - CONTINUE PRILOSEC 40 MG BID AS PRESCRIBED, rather than once daily and second dose PRN  - CONTINUE PEPCID 40 MG ONCE DAILY AT NIGHT AS PRESCRIBED    Encounter for monitoring long-term proton pump inhibitor therapy  -     Vitamin B12; Future; Expected date: 04/16/2024  -     Magnesium; Future; Expected date: 04/16/2024  - discussed with patient about long term use of reflux medications, the risk and benefits of using these medications long term, the risk of untreated GERD such as Gonzalez's esophagus/progression of Gonzalez's esophagus, and recommend a diet high in calcium and/or taking OTC calcium and vitamin d supplements as directed (such as Citracal +D), patient verbalized understanding  - recommend annual monitoring with blood work to include CMP, CBC, vitamin B12, and magnesium.    Follow up in about 3 months (around 7/16/2024), or if symptoms worsen or fail to improve.      If no improvement in symptoms or symptoms worsen, call/follow-up at clinic or go to ER.        31 minutes of total time spent on the encounter, which includes face to face time and non-face to face time preparing to see the patient (eg, review of tests), Obtaining and/or reviewing separately obtained history, Documenting clinical information in the electronic or other health record, Independently interpreting results (not separately reported) and communicating results to the patient/family/caregiver, or Care coordination (not separately reported).

## 2024-05-01 ENCOUNTER — TREATMENT PLANNING (OUTPATIENT)
Dept: GASTROENTEROLOGY | Facility: CLINIC | Age: 34
End: 2024-05-01
Payer: COMMERCIAL

## 2024-05-01 NOTE — PROGRESS NOTES
Reviewed medical records received from Dr. Armenta, summarized below and in medical & surgical history (endoscopies, etc), copies made & given to nurse to be scanned into system:   4/3/2024 labs: HBSAG screen negative, hepatitis B Core AB total negative, hep b surface ab qual reactive (consistent with immunity); sed rate WNL, CRP WNL; quantiferon TB gold plus neagtive; HCV antibody RFX to quant PCR nonreactive; SUDHA  profile: anti-nuclear AB BY IFA (RDL) positive, homogeneous pattern > 1:1280, speckled pattern > 1:1280, anti-SCL-70 AB (RDL) 129 (H), repeat anti-scl-70 positive; CMP WNL except fot creatinine 0.5 (L), CBC WNL    See records for full results (poor copy quality)  Recommendations:  Continue with previous recommendations & follow-up with rheumatology. Did not see vitamin B12 and magnesium levels checked on recent labs. Please have patient complete these as previously ordered. Message routed to staff.

## 2024-05-01 NOTE — Clinical Note
Please inform patient that we have received and reviewed her prior labs. Continue with previous recommendations & follow-up with rheumatology. Did not see vitamin B12 and magnesium levels checked on recent labs. Please have patient complete these as previously ordered. Thanks MEGAN

## 2024-05-02 ENCOUNTER — TELEPHONE (OUTPATIENT)
Dept: GASTROENTEROLOGY | Facility: CLINIC | Age: 34
End: 2024-05-02
Payer: COMMERCIAL

## 2024-05-02 NOTE — TELEPHONE ENCOUNTER
----- Message from DUARTE Sanders sent at 5/1/2024  9:17 AM CDT -----  Please inform patient that we have received and reviewed her prior labs. Continue with previous recommendations & follow-up with rheumatology. Did not see vitamin B12 and magnesium levels checked on recent labs. Please have patient complete these as previously ordered.  Thanks  MEGAN

## 2024-07-22 NOTE — H&P
History & Physical - Short Stay  Gastroenterology      SUBJECTIVE:     Procedure: Gastroscopy    Chief Complaint/Indication for Procedure:  Hx Gonzalez's Esophagus.    History of Present Illness:  See recent GI OV note:  Office Visit   4/16/2024  [facility]       Sherley Bravo, DUARTE  Gastroenterology Gonzalez's esophagus without dysplasia +1 more  Dx barretts  Reason for Visit     Progress Notes    Sherley Bravo FNP at 4/16/2024 10:00 AM    Status: Signed   Expand All Collapse All  Subjective:         Subjective  Patient ID: Nikki Rico is a 33 y.o. female Body mass index is 23.03 kg/m².     Chief Complaint: barretts     This patient is established with Dr. Michelle & myself.     Reports her rheumatologist recently did a full lab workup, Dr Armenta.     Gastroesophageal Reflux  She complains of heartburn (occurs a handful of times a month). She reports no abdominal pain, no belching, no chest pain, no choking, no coughing, no dysphagia, no globus sensation, no hoarse voice, no nausea or no sore throat. This is a chronic (started several years ago) problem. The problem has been gradually worsening. The symptoms are aggravated by ETOH and certain foods. Pertinent negatives include no fatigue, melena or weight loss. Risk factors include smoking/tobacco exposure, hiatal hernia, Gonzalez's esophagus and ETOH use. She has tried a PPI, a histamine-2 antagonist, an antacid and ETOH reduction (protonix 40 mg once daily and occasional BID PRN, pepcid 40 mg nightly; PAST: zantac- helped, prilosec, nexium- only mild relief with use, aciphex- cost) for the symptoms. Past procedures include an EGD.      Review of Systems   Constitutional:  Negative for appetite change, chills, fatigue, fever and weight loss.   HENT:  Negative for hoarse voice, sore throat and trouble swallowing.    Respiratory:  Negative for cough, choking and shortness of breath.    Cardiovascular:  Negative for chest pain.    Gastrointestinal:  Positive for heartburn (occurs a handful of times a month). Negative for abdominal pain, anal bleeding, blood in stool, constipation, diarrhea, dysphagia, melena, nausea, rectal pain and vomiting.       Assessment:      Assessment  1. Gonzalez's esophagus without dysplasia    2. Encounter for monitoring long-term proton pump inhibitor therapy             Plan:      Plan  Patient agreed to sign medical records release consent for us to obtain records from Dr. Armenta (to include lab work)     Gonzalez's esophagus without dysplasia  - schedule EGD, discussed procedure with patient, including risks and benefits, patient verbalized understanding  - discussed diagnosis in detail with patient and the need for long term reflux medication and surveillance EGDs; patient verbalized understanding and agreed with management plan  - Discussed about treatment options, patient verbalized understanding and patient reports she wants to continue current medications at prescribed dosages.  - CONTINUE PRILOSEC 40 MG BID AS PRESCRIBED, rather than once daily and second dose PRN  - CONTINUE PEPCID 40 MG ONCE DAILY AT NIGHT AS PRESCRIBED     Encounter for monitoring long-term proton pump inhibitor therapy  -     Vitamin B12; Future; Expected date: 04/16/2024  -     Magnesium; Future; Expected date: 04/16/2024  - discussed with patient about long term use of reflux medications, the risk and benefits of using these medications long term, the risk of untreated GERD such as Gonzalez's esophagus/progression of Gonzalez's esophagus, and recommend a diet high in calcium and/or taking OTC calcium and vitamin d supplements as directed (such as Citracal +D), patient verbalized understanding  - recommend annual monitoring with blood work to include CMP, CBC, vitamin B12, and magnesium.     Follow up in about 3 months (around 7/16/2024), or if symptoms worsen or fail to improve.             See last Upper GI endoscopy  12/6/2021  Indications:           Surveillance for malignancy due to personal                          history of Gonzalez's esophagus, Esophageal reflux.                          Her most recent EGD for Gonzalez's biopsy was about                          2 yrs ago, in [facility].   Providers:             Beto Michelle MD   This is a 31 year old female. Refer to note in patient chart for documentation of history and     physical. Her most recent EGD for Gonzalez's biopsy was about 2 yrs ago, in [facility].   Impression:            - Normal oropharynx.                          - Normal cricopharyngeus, upper third of esophagus                          and middle third of esophagus.                          - Esophageal mucosal changes consistent with                          long-segment Gonzalez's esophagus. Biopsied.                          - Normal cardia.                          - Minimal antritis. Biopsied.                          - Normal stomach otherwise.                          - Normal pylorus.                          - Duodenitis. Biopsied.                          - Normal major papilla.   Recommendation:        - Discharge patient to home.                          - Await pathology results.                          - Follow an antireflux regimen.                          - Continue present medications.                          - Use Aciphex (rabeprazole) 20 mg PO daily.                          - Use Pepcid (famotidine) 40 mg PO daily.                          - Call the G.I. clinic in 2 weeks for reports (if                          you haven't heard from us sooner) [phone].                          - Return to GI clinic in 4-6 weeks.                          - Repeat upper endoscopy in 2-3 years for                          surveillance.   Beto Michelle MD   12/6/2021       1. Esophagus at 32 cm (biopsy):  No intestinal metaplasia, no dysplasia  Mild active esophagitis  No  fungus (GMS stain)  Multiple deeper levels examined  2. Esophagus at 34 cm (biopsy):  Intestinal metaplasia, no dysplasia  Consistent with Gonzalez's esophagus  Mild active esophagitis  No fungus (GMS stain)  3. Esophagus at 36 cm (biopsy):  Intestinal metaplasia, no dysplasia  Mucus and oxyntic type glands with chronic inflammation  4. Stomach, pre pylorus (biopsy):  Antral mucosa with reactive/regenerative changes  Considerations include erosive/chemical type gastropathy  No Helicobacter organisms  5. Duodenum (biopsy):  Active duodenitis and reactive changes  Considerations include peptic disease           PTA Medications   Medication Sig    EScitalopram oxalate (LEXAPRO) 10 MG tablet TAKE 1 TABLET(10 MG) BY MOUTH DAILY    famotidine (PEPCID) 40 MG tablet TAKE 1 TABLET(40 MG) BY MOUTH EVERY EVENING    hydrOXYzine HCL (ATARAX) 25 MG tablet Take 1 tablet (25 mg total) by mouth 3 (three) times daily as needed for Itching or Anxiety.    mycophenolate (CELLCEPT) 500 mg Tab Take 500 mg by mouth 2 (two) times daily.    omeprazole (PRILOSEC) 40 MG capsule TAKE 1 CAPSULE(40 MG) BY MOUTH TWICE DAILY BEFORE MEALS    levonorgestreL (MIRENA) 21 mcg/24 hours (8 yrs) 52 mg IUD        Review of patient's allergies indicates:  No Known Allergies     Past Medical History:   Diagnosis Date    Gonzalez's esophagus 2013    Depression with anxiety     Lung fibrosis     Raynaud's disease     Scleroderma      Past Surgical History:   Procedure Laterality Date    ESOPHAGOGASTRODUODENOSCOPY  01/2017    patient states barretts and hiatal hernia--told to rpt 3yrs    ESOPHAGOGASTRODUODENOSCOPY  2018    Dr. Yoo, repeat in 3 years per patient report    ESOPHAGOGASTRODUODENOSCOPY N/A 12/06/2021    Procedure: EGD (ESOPHAGOGASTRODUODENOSCOPY);  Surgeon: Beto Michelle Jr., MD;  Location: [facility];  Service: Endoscopy;  Laterality: N/A;  Repeat upper endoscopy in 2-3 years for surveillance of Gonzalez's esophagus    HYSTEROSCOPY N/A  "04/07/2022    Procedure: HYSTEROSCOPY/ IUD removal;  Surgeon: Mikey Cazares MD;  Location: [facility];  Service: OB/GYN;  Laterality: N/A;    HYSTEROSCOPY  04/07/2022    Procedure: ;  Surgeon: Mikey Cazares MD;  Location: [facility];  Service: OB/GYN;;     Family History   Problem Relation Name Age of Onset    Heart disease Mother          had cabg    Cancer Father          lung (smoker)    No Known Problems Sister      No Known Problems Sister      No Known Problems Brother      No Known Problems Brother      No Known Problems Brother      Colon cancer Neg Hx      Crohn's disease Neg Hx      Esophageal cancer Neg Hx      Stomach cancer Neg Hx      Ulcerative colitis Neg Hx      Glaucoma Neg Hx      Macular degeneration Neg Hx      Retinal detachment Neg Hx       Social History     Tobacco Use    Smoking status: Former     Types: Cigarettes    Smokeless tobacco: Never   Substance Use Topics    Alcohol use: Yes     Alcohol/week: 5.0 standard drinks of alcohol     Types: 5 Shots of liquor per week    Drug use: Never         OBJECTIVE:     Vital Signs (Most Recent)  Temp: 98.2 °F (36.8 °C) (07/23/24 0859)  Pulse: 71 (07/23/24 0859)  Resp: 13 (07/23/24 0859)  BP: (!) 102/58 (07/23/24 0859)  SpO2: 99 % (07/23/24 0859)    Physical Exam:  : Ht: 5' 7" (170.2 cm)   Wt: 66.7 kg   BMI: 23.02 kg/m²                                                       GENERAL:  Comfortable, in no acute distress.                                 HEENT EXAM:  Nonicteric.  No adenopathy.  Oropharynx is clear.               NECK:  Supple.                                                               LUNGS:  Clear.                                                               CARDIAC:  Regular rate and rhythm.  S1, S2.  No murmur.                      ABDOMEN:  Soft, positive bowel sounds, nontender.  No hepatosplenomegaly or masses.  No rebound or guarding.                                             EXTREMITIES:  No edema.     MENTAL STATUS:  Alert " and oriented.    ASSESSMENT/PLAN:     Assessment: Hx Gonzalez's Esophagus.    Plan: Gastroscopy    Anesthesia Plan:   MAC / General Anaesthesia    ASA Grade: ASA 2 - Patient with mild systemic disease with no functional limitations    MALLAMPATI SCORE: I (soft palate, uvula, fauces, and tonsillar pillars visible)

## 2024-07-23 ENCOUNTER — HOSPITAL ENCOUNTER (OUTPATIENT)
Facility: HOSPITAL | Age: 34
Discharge: HOME | End: 2024-07-23
Attending: INTERNAL MEDICINE | Admitting: FAMILY MEDICINE
Payer: COMMERCIAL

## 2024-07-23 ENCOUNTER — ANESTHESIA EVENT (OUTPATIENT)
Dept: ENDOSCOPY | Facility: HOSPITAL | Age: 34
End: 2024-07-23
Payer: COMMERCIAL

## 2024-07-23 ENCOUNTER — ANESTHESIA (OUTPATIENT)
Dept: ENDOSCOPY | Facility: HOSPITAL | Age: 34
End: 2024-07-23
Payer: COMMERCIAL

## 2024-07-23 VITALS
SYSTOLIC BLOOD PRESSURE: 100 MMHG | DIASTOLIC BLOOD PRESSURE: 55 MMHG | WEIGHT: 147 LBS | HEIGHT: 67 IN | RESPIRATION RATE: 16 BRPM | TEMPERATURE: 98 F | BODY MASS INDEX: 23.07 KG/M2 | OXYGEN SATURATION: 98 % | HEART RATE: 66 BPM

## 2024-07-23 DIAGNOSIS — K22.70 BARRETT'S ESOPHAGUS: ICD-10-CM

## 2024-07-23 LAB
B-HCG UR QL: NEGATIVE
CTP QC/QA: YES

## 2024-07-23 PROCEDURE — 88305 TISSUE EXAM BY PATHOLOGIST: CPT | Mod: PO | Performed by: STUDENT IN AN ORGANIZED HEALTH CARE EDUCATION/TRAINING PROGRAM

## 2024-07-23 PROCEDURE — 37000008 HC ANESTHESIA 1ST 15 MINUTES: Mod: PO | Performed by: INTERNAL MEDICINE

## 2024-07-23 PROCEDURE — 63600175 PHARM REV CODE 636 W HCPCS: Mod: PO | Performed by: NURSE ANESTHETIST, CERTIFIED REGISTERED

## 2024-07-23 PROCEDURE — 43239 EGD BIOPSY SINGLE/MULTIPLE: CPT | Mod: PO | Performed by: INTERNAL MEDICINE

## 2024-07-23 PROCEDURE — 37000009 HC ANESTHESIA EA ADD 15 MINS: Mod: PO | Performed by: INTERNAL MEDICINE

## 2024-07-23 PROCEDURE — 88305 TISSUE EXAM BY PATHOLOGIST: CPT | Mod: 26,,, | Performed by: STUDENT IN AN ORGANIZED HEALTH CARE EDUCATION/TRAINING PROGRAM

## 2024-07-23 PROCEDURE — 63600175 PHARM REV CODE 636 W HCPCS: Mod: PO | Performed by: INTERNAL MEDICINE

## 2024-07-23 PROCEDURE — 25000003 PHARM REV CODE 250: Mod: PO | Performed by: NURSE ANESTHETIST, CERTIFIED REGISTERED

## 2024-07-23 PROCEDURE — 88342 IMHCHEM/IMCYTCHM 1ST ANTB: CPT | Mod: 26,,, | Performed by: STUDENT IN AN ORGANIZED HEALTH CARE EDUCATION/TRAINING PROGRAM

## 2024-07-23 PROCEDURE — 27201012 HC FORCEPS, HOT/COLD, DISP: Mod: PO | Performed by: INTERNAL MEDICINE

## 2024-07-23 PROCEDURE — 81025 URINE PREGNANCY TEST: CPT | Mod: PO | Performed by: INTERNAL MEDICINE

## 2024-07-23 PROCEDURE — 43239 EGD BIOPSY SINGLE/MULTIPLE: CPT | Mod: ,,, | Performed by: INTERNAL MEDICINE

## 2024-07-23 PROCEDURE — 88342 IMHCHEM/IMCYTCHM 1ST ANTB: CPT | Mod: PO | Performed by: STUDENT IN AN ORGANIZED HEALTH CARE EDUCATION/TRAINING PROGRAM

## 2024-07-23 RX ORDER — PROPOFOL 10 MG/ML
VIAL (ML) INTRAVENOUS
Status: DISCONTINUED | OUTPATIENT
Start: 2024-07-23 | End: 2024-07-23

## 2024-07-23 RX ORDER — SODIUM CHLORIDE, SODIUM LACTATE, POTASSIUM CHLORIDE, CALCIUM CHLORIDE 600; 310; 30; 20 MG/100ML; MG/100ML; MG/100ML; MG/100ML
INJECTION, SOLUTION INTRAVENOUS CONTINUOUS
Status: DISCONTINUED | OUTPATIENT
Start: 2024-07-23 | End: 2024-07-23 | Stop reason: HOSPADM

## 2024-07-23 RX ORDER — LIDOCAINE HYDROCHLORIDE 20 MG/ML
INJECTION INTRAVENOUS
Status: DISCONTINUED | OUTPATIENT
Start: 2024-07-23 | End: 2024-07-23

## 2024-07-23 RX ORDER — SODIUM CHLORIDE 0.9 % (FLUSH) 0.9 %
10 SYRINGE (ML) INJECTION
Status: DISCONTINUED | OUTPATIENT
Start: 2024-07-23 | End: 2024-07-23 | Stop reason: HOSPADM

## 2024-07-23 RX ADMIN — PROPOFOL 25 MG: 10 INJECTION, EMULSION INTRAVENOUS at 10:07

## 2024-07-23 RX ADMIN — PROPOFOL 50 MG: 10 INJECTION, EMULSION INTRAVENOUS at 10:07

## 2024-07-23 RX ADMIN — LIDOCAINE HYDROCHLORIDE 100 MG: 20 INJECTION INTRAVENOUS at 10:07

## 2024-07-23 RX ADMIN — PROPOFOL 150 MG: 10 INJECTION, EMULSION INTRAVENOUS at 10:07

## 2024-07-23 RX ADMIN — SODIUM CHLORIDE, POTASSIUM CHLORIDE, SODIUM LACTATE AND CALCIUM CHLORIDE: 600; 310; 30; 20 INJECTION, SOLUTION INTRAVENOUS at 09:07

## 2024-07-23 NOTE — DISCHARGE INSTRUCTIONS
Recovery After Procedural Sedation (Adult)   You have been given medicine by vein to make you sleep during your surgery. This may have included both a pain medicine and sleeping medicine. Most of the effects have worn off. But you may still have some drowsiness for the next 6 to 8 hours.  Home care  Follow these guidelines when you get home:  For the next 8 hours, you should be watched by a responsible adult. This person should make sure your condition is not getting worse.  Don't drink any alcohol for the next 24 hours.  Don't drive, operate dangerous machinery, or make important business or personal decisions during the next 24 hours.  To prevent injury or falls, use caution when standing and walking for at least 24 hours after your procedure.  Note: Your healthcare provider may tell you not to take any medicine by mouth for pain or sleep in the next 4 hours. These medicines may react with the medicines you were given in the hospital. This could cause a much stronger response than usual.  Follow-up care  Follow up with your healthcare provider if you are not alert and back to your usual level of activity within 12 hours.  When to seek medical advice  Call your healthcare provider right away if any of these occur:  Drowsiness gets worse  Weakness or dizziness gets worse  Repeated vomiting  You can't be awakened  Fever  New rash  [employer] last reviewed this educational content on 9/1/2019  © 5001-1386 [employer], [employer]. [address] [place] [address], [place] [address]. All rights reserved. This information is not intended as a substitute for professional medical care. Always follow your healthcare professional's instructions         Tips to Control Acid Reflux    To control acid reflux, youll need to make some basic diet and lifestyle changes. The simple steps outlined below may be all youll need to ease discomfort.  Watch what you eat  Avoid fatty foods and spicy foods.  Eat fewer acidic foods, such as citrus  and tomato-based foods. These can increase symptoms.  Limit drinking alcohol, caffeine, and fizzy beverages. All increase acid reflux.  Try limiting chocolate, peppermint, and spearmint. These can worsen acid reflux in some people.  Watch when you eat  Avoid lying down for 3 hours after eating.  Do not snack before going to bed.  Raise your head  Raising your head and upper body by 4 to 6 inches helps limit reflux when youre lying down. Put blocks under the head of your bed frame to raise it.  Other changes  Lose weight, if you need to  Dont exercise near bedtime  Avoid tight-fitting clothes  Limit aspirin and ibuprofen  Stop smoking   Date Last Reviewed: 7/1/2016  © 3592-0747 [employer]. [address] [place] [address], [place] [address]. All rights reserved. This information is not intended as a substitute for professional medical care. Always follow your healthcare professional's instructions.         High-Fiber Diet  Fiber is in fruits, vegetables, cereals, and grains. Fiber passes through your body undigested. A high-fiber diet helps food move through your intestinal tract. The added bulk is helpful in preventing constipation. In people with diverticulosis, fiber helps clean out the pouches along the colon wall. It also prevents new pouches from forming. A high-fiber diet reduces the risk of colon cancer. It also lowers blood cholesterol and prevents high blood sugar in people with diabetes.    The fiber-rich foods listed below should be part of your diet. If you are not used to high-fiber foods, start with 1 or 2 foods from this list. Every 3 to 4 days add a new one to your diet. Do this until you are eating 4 high-fiber foods per day. This should give you 20 to 35 grams of fiber a day. It is also important to drink a lot of water when you are on this diet. You should have 6 to 8 glasses of water a day. Water makes the fiber swell and increases the benefit.  Foods high in dietary fiber  The following  foods are high in dietary fiber:  Breads. Breads made with 100% whole-wheat flour; breonna, wheat, or rye crackers; whole-grain tortillas, bran muffins.  Cereals. Whole-grain and bran cereals with bran (shredded wheat, wheat flakes, raisin bran, corn bran); oatmeal, rolled oats, granola, and brown rice.  Fruits. Fresh fruits and their edible skins (pears, prunes, raisins, berries, apples, and apricots); bananas, citrus fruit, mangoes, pineapple; and prune juice.  Nuts. Any nuts and seeds.  Vegetables. Best served raw or lightly cooked. All types, especially: green peas, celery, eggplant, potatoes, spinach, broccoli, [place] sprouts, winter squash, carrots, cauliflower, soybeans, lentils, and fresh and dried beans of all kinds.  Other. Popcorn, any spices.  Date Last Reviewed: 8/1/2016  © 0217-5686 [employer]. [address] [place] [address]. All rights reserved. This information is not intended as a substitute for professional medical care. Always follow your healthcare professional's instructions.

## 2024-07-23 NOTE — ANESTHESIA PREPROCEDURE EVALUATION
07/23/2024  Nikki Rico is a 33 y.o., female.      Pre-op Assessment    I have reviewed the Patient Summary Reports.     I have reviewed the Nursing Notes. I have reviewed the NPO Status.   I have reviewed the Medications.     Review of Systems  Anesthesia Hx:  No problems with previous Anesthesia                Social:  Former Smoker       Hematology/Oncology:  Hematology Normal   Oncology Normal                                   EENT/Dental:  EENT/Dental Normal           Cardiovascular:  Cardiovascular Normal                                            Pulmonary:         Lung fibrosis                Renal/:  Renal/ Normal                 Hepatic/GI:     GERD      Gerd          Musculoskeletal:  Musculoskeletal Normal                Neurological:  Neurology Normal                                      Psych:  Psychiatric History anxiety depression                Physical Exam  General: Well nourished, Cooperative, Oriented and Alert    Airway:  Mallampati: II   Mouth Opening: Small, but > 3cm  TM Distance: Normal  Tongue: Normal  Neck ROM: Normal ROM    Dental:  Intact    Chest/Lungs:  Normal Respiratory Rate    Heart:  Rate: Normal        Anesthesia Plan  Type of Anesthesia, risks & benefits discussed:    Anesthesia Type: Gen Natural Airway  Intra-op Monitoring Plan: Standard ASA Monitors  Induction:  IV  Informed Consent: Informed consent signed with the Patient and all parties understand the risks and agree with anesthesia plan.  All questions answered.   ASA Score: 2    Ready For Surgery From Anesthesia Perspective.     .

## 2024-07-23 NOTE — BRIEF OP NOTE
Discharge Note  Short Stay      SUMMARY     Admit Date: 7/23/2024    Attending Physician: Beto Michelle Jr., MD     Discharge Physician: Beto Michelle Jr., MD    Discharge Date: 7/23/2024 10:47 AM    Final Diagnosis: Gonzalez's esophagus without dysplasia [K22.70]    Impression:            - Normal oropharynx.                          - Normal larynx.                          - Normal cricopharyngeus.                          - Normal upper third of esophagus and middle third                          of esophagus.                          - Esophageal mucosal changes secondary to                          established long-segment Gonzalez's disease, 4-5                          cm. Biopsied.                          - Small hiatal hernia.                          - Normal gastric fundus and gastric body.                          - Normal antrum and prepyloric region of the                          stomach. Biopsied.                          - Normal pylorus.                          - Normal examined duodenum.                          - Normal major papilla.   Recommendation:        - Discharge patient to home.                          - Await pathology results.                          - Follow an antireflux regimen.                          - Continue present medications.                          - Use Prilosec (omeprazole) 40 mg PO daily.                          - Use Pepcid (famotidine) 40 mg PO nightly.                          - Repeat upper endoscopy in 3 years for                          surveillance of Gonzalez's esophagus.                          - Call the G.I. clinic in 2 weeks for reports (if                          you haven't heard from us sooner) [phone].   Beto Michelle MD   7/23/2024       Disposition: HOME OR SELF CARE    Patient Instructions:   Current Discharge Medication List        CONTINUE these medications which have NOT CHANGED    Details   EScitalopram oxalate (LEXAPRO) 10  MG tablet TAKE 1 TABLET(10 MG) BY MOUTH DAILY  Qty: 30 tablet, Refills: 2    Associated Diagnoses: Anxiety and depression      famotidine (PEPCID) 40 MG tablet TAKE 1 TABLET(40 MG) BY MOUTH EVERY EVENING  Qty: 30 tablet, Refills: 2    Associated Diagnoses: Gastroesophageal reflux disease without esophagitis; History of Gonzalez's esophagus      hydrOXYzine HCL (ATARAX) 25 MG tablet Take 1 tablet (25 mg total) by mouth 3 (three) times daily as needed for Itching or Anxiety.  Qty: 30 tablet, Refills: 1    Associated Diagnoses: Restless leg syndrome; Pruritus; Anxiety and depression; Insomnia due to medical condition      mycophenolate (CELLCEPT) 500 mg Tab Take 500 mg by mouth 2 (two) times daily.      omeprazole (PRILOSEC) 40 MG capsule TAKE 1 CAPSULE(40 MG) BY MOUTH TWICE DAILY BEFORE MEALS  Qty: 60 capsule, Refills: 2    Associated Diagnoses: Gastroesophageal reflux disease without esophagitis; History of Gonzalez's esophagus      levonorgestreL (MIRENA) 21 mcg/24 hours (8 yrs) 52 mg IUD              Discharge Procedure Orders (must include Diet, Follow-up, Activity)    Follow Up:  Follow up with PCP as per your routine.  Please follow an anti reflux diet and a high fiber diet.  Activity as tolerated.    No driving day of procedure.

## 2024-07-23 NOTE — PROVATION PATIENT INSTRUCTIONS
Discharge Summary/Instructions after an Endoscopic Procedure  Patient Name: Nikki Rico  Patient MRN: [id number]  Patient YOB: 1990 Tuesday, July 23, 2024  Beto Michelle MD  Dear patient,  As a result of recent federal legislation (The Federal Cures Act), you may   receive lab or pathology results from your procedure in your [facility]   account before your physician is able to contact you. Your physician or   their representative will relay the results to you with their   recommendations at their soonest availability.  Thank you,  RESTRICTIONS:  During your procedure today, you received medications for sedation.  These   medications may affect your judgment, balance and coordination.  Therefore,   for 24 hours, you have the following restrictions:   - DO NOT drive a car, operate machinery, make legal/financial decisions,   sign important papers or drink alcohol.    ACTIVITY:  Today: no heavy lifting, straining or running due to procedural   sedation/anesthesia.  The following day: return to full activity including work.  DIET:  Eat and drink normally unless instructed otherwise.     TREATMENT FOR COMMON SIDE EFFECTS:  - Mild abdominal pain, nausea, belching, bloating or excessive gas:  rest,   eat lightly and use a heating pad.  - Sore Throat: treat with throat lozenges and/or gargle with warm salt   water.  - Because air was used during the procedure, expelling large amounts of air   from your rectum or belching is normal.  - If a bowel prep was taken, you may not have a bowel movement for 1-3 days.    This is normal.  SYMPTOMS TO WATCH FOR AND REPORT TO YOUR PHYSICIAN:  1. Abdominal pain or bloating, other than gas cramps.  2. Chest pain.  3. Back pain.  4. Signs of infection such as: chills or fever occurring within 24 hours   after the procedure.  5. Rectal bleeding, which would show as bright red, maroon, or black stools.   (A tablespoon of blood from the rectum is not serious, especially  if   hemorrhoids are present.)  6. Vomiting.  7. Weakness or dizziness.  GO DIRECTLY TO THE NEAREST EMERGENCY ROOM IF YOU HAVE ANY OF THE FOLLOWING:      Difficulty breathing              Chills and/or fever over 101 F   Persistent vomiting and/or vomiting blood   Severe abdominal pain   Severe chest pain   Black, tarry stools   Bleeding- more than one tablespoon   Any other symptom or condition that you feel may need urgent attention  Your doctor recommends these additional instructions:  If any biopsies were taken, your doctors clinic will contact you in 1 to 2   weeks with any results.  Follow an antireflux regimen.  This includes:       - Do not lie down for at least 3 to 4 hours after meals.        - Raise the head of the bed 4 to 6 inches.        - Decrease excess weight.        - Avoid citrus juices and other acidic foods, alcohol, chocolate, mints,   coffee and other caffeinated beverages, carbonated beverages, fatty and   fried foods.        - Avoid tight-fitting clothing.        - Avoid cigarettes and other tobacco products.   Continue your present medications.   Take Prilosec (omeprazole) 40 mg by mouth once a day.   Take Pepcid (famotidine) 40 mg by mouth every night.   Your physician has recommended a repeat upper endoscopy in 3 years for   surveillance of Gonzalez's esophagus.  For questions, problems or results please call your physician - Beto Michelle MD at Work:  [phone].  EMERGENCY PHONE NUMBER: [phone], LAB RESULTS: [phone]  IF A COMPLICATION OR EMERGENCY SITUATION ARISES AND YOU ARE UNABLE TO REACH   YOUR PHYSICIAN - GO DIRECTLY TO THE EMERGENCY ROOM.  ___________________________________________  Nurse Signature  ___________________________________________  Patient/Designated Responsible Party Signature  Beto Michelle MD  7/23/2024 10:45:53 AM  This report has been verified and signed electronically.  Dear patient,  As a result of recent federal legislation (The  Federal Cures Act), you may   receive lab or pathology results from your procedure in your [facility]   account before your physician is able to contact you. Your physician or   their representative will relay the results to you with their   recommendations at their soonest availability.  Thank you.  PROVATION

## 2024-07-23 NOTE — ANESTHESIA POSTPROCEDURE EVALUATION
Anesthesia Post Evaluation    Patient: Nikki Blanchard [place]    Procedure(s) Performed: Procedure(s) (LRB):  EGD (ESOPHAGOGASTRODUODENOSCOPY) (N/A)    Final Anesthesia Type: general      Patient location during evaluation: PACU  Patient participation: Yes- Able to Participate  Level of consciousness: awake and alert  Post-procedure vital signs: reviewed and stable  Pain management: adequate  Airway patency: patent    PONV status at discharge: No PONV  Anesthetic complications: no      Cardiovascular status: hemodynamically stable  Respiratory status: unassisted and room air  Hydration status: euvolemic  Follow-up not needed.              Vitals Value Taken Time   BP 87/48 07/23/24 1030     07/23/24 1046   Pulse 73 07/23/24 1030   Resp 14 07/23/24 1030   SpO2 96 % 07/23/24 1030         No case tracking events are documented in the log.      Pain/Rene Score: Rene Score: 6 (7/23/2024 10:30 AM)

## 2024-07-23 NOTE — TRANSFER OF CARE
"Anesthesia Transfer of Care Note    Patient: Nikki Blanchard [place]    Procedure(s) Performed: Procedure(s) (LRB):  EGD (ESOPHAGOGASTRODUODENOSCOPY) (N/A)    Patient location: PACU    Anesthesia Type: general    Transport from OR: Transported from OR on room air with adequate spontaneous ventilation    Post pain: adequate analgesia    Post assessment: no apparent anesthetic complications and tolerated procedure well    Post vital signs: stable    Level of consciousness: sedated and awake    Nausea/Vomiting: no nausea/vomiting    Complications: none    Transfer of care protocol was followed      Last vitals: Visit Vitals  BP (!) 102/58   Pulse 71   Temp 36.8 °C (98.2 °F) (Skin)   Resp 13   Ht 5' 7" (1.702 m)   Wt 66.7 kg (147 lb)   SpO2 99%   Breastfeeding No   BMI 23.02 kg/m²     "

## 2024-07-28 LAB
FINAL PATHOLOGIC DIAGNOSIS: NORMAL
GROSS: NORMAL
Lab: NORMAL
MICROSCOPIC EXAM: NORMAL

## 2024-08-28 ENCOUNTER — TELEPHONE (OUTPATIENT)
Dept: GASTROENTEROLOGY | Facility: CLINIC | Age: 34
End: 2024-08-28
Payer: COMMERCIAL

## 2024-08-28 NOTE — TELEPHONE ENCOUNTER
----- Message from Beto Michelle Jr., MD sent at 8/27/2024 10:38 PM CDT -----  Please let patient know.  The esophagus biopsies were fine.  Gonzalez's esophagus, but no dysplasia.  The stomach biopsies came back fine, too.  And no evidence of the bacteria.  Rec's remain the same.  Take meds as directed.  Repeat again in 3 years.